# Patient Record
(demographics unavailable — no encounter records)

---

## 2025-03-13 NOTE — HISTORY OF PRESENT ILLNESS
[de-identified] : Mrs Olson presents with her mother to discuss her multiple neck and back complaints.  She does not have recent imaging for review at the time of this visit.  Her symptoms started several months prior and worsened in the past 2 months.  We will start on physical therapy and referred for pain management and have her come back in obtain updated imaging scans.  PHYSICAL EXAM: Constitutional: Well appearing, no distress HEENT: Normocephalic Atraumatic Psychiatric: Alert and oriented x 3, normal mood Pulmonary: No respiratory distress   Neurologic: CN II-XII grossly intact Palpation: + no tenderness to SI joint upon palpation Strength: Full strength in all major muscle groups, no atrophy Sensation: Full sensation to light touch in all extremities ROM normal SLR negative b/l Gait: Normal

## 2025-03-13 NOTE — HISTORY OF PRESENT ILLNESS
[de-identified] : Mrs Olson presents with her mother to discuss her multiple neck and back complaints.  She does not have recent imaging for review at the time of this visit.  Her symptoms started several months prior and worsened in the past 2 months.  We will start on physical therapy and referred for pain management and have her come back in obtain updated imaging scans.  PHYSICAL EXAM: Constitutional: Well appearing, no distress HEENT: Normocephalic Atraumatic Psychiatric: Alert and oriented x 3, normal mood Pulmonary: No respiratory distress   Neurologic: CN II-XII grossly intact Palpation: + no tenderness to SI joint upon palpation Strength: Full strength in all major muscle groups, no atrophy Sensation: Full sensation to light touch in all extremities ROM normal SLR negative b/l Gait: Normal

## 2025-07-07 NOTE — HISTORY OF PRESENT ILLNESS
[Initial Evaluation] : an initial evaluation of [Nasal Congestion] : nasal congestion [Nasal Drainage] : nasal drainage [Postnasal Drainage] : postnasal drainage [Currently Experiencing] : The patient is currently experiencing symptoms.

## 2025-07-07 NOTE — ASSESSMENT
[FreeTextEntry1] : Intermittent pleuritic CP resolved  SP Cardiology evaluation No smoking history.  UACS / PND  Recent DX of Breast Ca sp resection.  Going for RT